# Patient Record
Sex: FEMALE | Race: WHITE
[De-identification: names, ages, dates, MRNs, and addresses within clinical notes are randomized per-mention and may not be internally consistent; named-entity substitution may affect disease eponyms.]

---

## 2017-06-12 ENCOUNTER — HOSPITAL ENCOUNTER (OUTPATIENT)
Dept: HOSPITAL 45 - C.MAMM | Age: 72
Discharge: HOME | End: 2017-06-12
Attending: OBSTETRICS & GYNECOLOGY
Payer: COMMERCIAL

## 2017-06-12 DIAGNOSIS — Z12.31: Primary | ICD-10-CM

## 2017-06-13 NOTE — MAMMOGRAPHY REPORT
BILATERAL DIGITAL SCREENING MAMMOGRAM WITH CAD: 6/12/2017

CLINICAL HISTORY: Routine screening examination.  





TECHNIQUE: Bilateral CC and MLO views were obtained.  Current study was also evaluated with a Compute
r Aided Detection (CAD) system.  



COMPARISON: Comparison is made to exams dated:  6/8/2016 mammogram, 6/4/2015 mammogram, 5/12/2014 [facility]
mogram, 11/12/2013 ultrasound, 11/12/2013 mammogram, and 4/30/2013 ultrasound - [facility].   



BREAST COMPOSITION:  The tissue of both breasts is almost entirely fatty.  



FINDINGS: There are scattered bilateral benign calcifications, round microcalcifications and vascular
 calcifications.  Stable nodularity in the lateral left breast.  No new suspicious spiculated or irre
gular mass, architectural distortion or cluster of suspicious microcalcifications is seen.  



IMPRESSION:  ACR BI-RADS CATEGORY 1: NEGATIVE

There is no mammographic evidence of malignancy. A 1 year screening mammogram is recommended.  The pa
tient will receive written notification of the results.  





Approximately 10% of breast cancers are not detected with mammography. A negative mammographic report
 should not delay biopsy if a clinically suggestive mass is present.



Darcie Ortega M.D.          

ay/:6/12/2017 13:43:03  



Imaging Technologist: Antonette CARVAJAL(R)(M), [facility]

letter sent: Normal 1/2  

BI-RADS Code: ACR BI-RADS Category 1: Negative

## 2018-03-19 ENCOUNTER — HOSPITAL ENCOUNTER (OUTPATIENT)
Dept: HOSPITAL 45 - C.LABSPEC | Age: 73
Discharge: HOME | End: 2018-03-19
Attending: PHYSICIAN ASSISTANT
Payer: COMMERCIAL

## 2018-03-19 DIAGNOSIS — N94.9: ICD-10-CM

## 2018-03-19 DIAGNOSIS — R39.9: Primary | ICD-10-CM

## 2018-03-22 LAB — HSV SPEC CULT: (no result)

## 2018-03-26 ENCOUNTER — HOSPITAL ENCOUNTER (OUTPATIENT)
Dept: HOSPITAL 45 - C.PATHSPEC | Age: 73
Discharge: HOME | End: 2018-03-26
Attending: PHYSICIAN ASSISTANT
Payer: COMMERCIAL

## 2018-03-26 ENCOUNTER — HOSPITAL ENCOUNTER (OUTPATIENT)
Dept: HOSPITAL 45 - C.LABSPEC | Age: 73
Discharge: HOME | End: 2018-03-26
Attending: PHYSICIAN ASSISTANT
Payer: COMMERCIAL

## 2018-03-26 DIAGNOSIS — N94.9: Primary | ICD-10-CM

## 2018-03-26 DIAGNOSIS — N90.89: Primary | ICD-10-CM

## 2018-03-26 DIAGNOSIS — N90.89: ICD-10-CM

## 2018-08-07 ENCOUNTER — HOSPITAL ENCOUNTER (OUTPATIENT)
Dept: HOSPITAL 45 - C.MAMM | Age: 73
Discharge: HOME | End: 2018-08-07
Attending: OBSTETRICS & GYNECOLOGY
Payer: COMMERCIAL

## 2018-08-07 DIAGNOSIS — Z12.31: Primary | ICD-10-CM

## 2018-08-08 NOTE — MAMMOGRAPHY REPORT
BILATERAL DIGITAL SCREENING MAMMOGRAM TOMOSYNTHESIS WITH CAD: 8/7/2018

CLINICAL HISTORY: Routine screening. Patient has no complaints.  





TECHNIQUE: The study was acquired using full field digital technology and interpreted from soft copy.
 Breast tomosynthesis in addition to standard 2D mammography was performed. Current study was also ev
aluated with a Computer Aided Detection (CAD) system.  



COMPARISON: Comparison is made to exams dated:  6/12/2017 mammogram, 6/8/2016 mammogram, 6/4/2015 [facility]
mogram, 5/12/2014 mammogram, 11/12/2013 ultrasound, and 4/30/2013 ultrasound - [facility] [address].   

BREAST COMPOSITION: The tissue of both breasts is almost entirely fatty.  



FINDINGS: There are mild to moderate vascular calcifications in the breasts.  Scattered benign rounde
d rim calcifications.  Stable asymmetries in the lateral left breast. No suspicious mass, architectur
al distortion or cluster of microcalcifications is seen.  



IMPRESSION: ACR BI-RADS CATEGORY 1: NEGATIVE

There is no mammographic evidence of malignancy. A 1 year screening mammogram is recommended.(08/08/2
019)  The patient will receive written notification of the results.  





Some breast cancers are not detected with mammography. A negative mammographic report should not zbigniew
y biopsy if a clinically suggestive mass is present.



Darcie Ortega M.D.          

ay/:8/7/2018 16:03:14  



Imaging Technologist: RT Arthur(PAOLO)(M), [facility] [address]

letter sent: Normal 1/2  

BI-RADS Code: ACR BI-RADS Category 1: Negative